# Patient Record
Sex: MALE | Race: ASIAN | URBAN - METROPOLITAN AREA
[De-identification: names, ages, dates, MRNs, and addresses within clinical notes are randomized per-mention and may not be internally consistent; named-entity substitution may affect disease eponyms.]

---

## 2018-12-28 ENCOUNTER — EMERGENCY (EMERGENCY)
Facility: HOSPITAL | Age: 48
LOS: 1 days | Discharge: HOME | End: 2018-12-28
Attending: EMERGENCY MEDICINE
Payer: MEDICAID

## 2018-12-28 VITALS
TEMPERATURE: 98 F | HEART RATE: 93 BPM | DIASTOLIC BLOOD PRESSURE: 67 MMHG | OXYGEN SATURATION: 97 % | WEIGHT: 169.98 LBS | RESPIRATION RATE: 18 BRPM | HEIGHT: 68 IN | SYSTOLIC BLOOD PRESSURE: 107 MMHG

## 2018-12-28 PROCEDURE — 99284 EMERGENCY DEPT VISIT MOD MDM: CPT | Mod: 25

## 2018-12-28 PROCEDURE — 62270 DX LMBR SPI PNXR: CPT

## 2018-12-28 PROCEDURE — 93010 ELECTROCARDIOGRAM REPORT: CPT | Mod: 59

## 2018-12-28 PROCEDURE — 70450 CT HEAD/BRAIN W/O DYE: CPT | Mod: 26

## 2018-12-28 RX ORDER — METOCLOPRAMIDE HCL 10 MG
10 TABLET ORAL ONCE
Qty: 0 | Refills: 0 | Status: COMPLETED | OUTPATIENT
Start: 2018-12-28 | End: 2018-12-28

## 2018-12-28 RX ORDER — ACETAMINOPHEN 500 MG
1000 TABLET ORAL ONCE
Qty: 0 | Refills: 0 | Status: COMPLETED | OUTPATIENT
Start: 2018-12-28 | End: 2018-12-28

## 2018-12-28 RX ORDER — ONDANSETRON 8 MG/1
4 TABLET, FILM COATED ORAL ONCE
Qty: 0 | Refills: 0 | Status: DISCONTINUED | OUTPATIENT
Start: 2018-12-28 | End: 2018-12-28

## 2018-12-28 RX ORDER — SODIUM CHLORIDE 9 MG/ML
1000 INJECTION INTRAMUSCULAR; INTRAVENOUS; SUBCUTANEOUS ONCE
Qty: 0 | Refills: 0 | Status: COMPLETED | OUTPATIENT
Start: 2018-12-28 | End: 2018-12-28

## 2018-12-28 NOTE — ED ADULT NURSE NOTE - ED STAT RN HANDOFF DETAILS
Bedside report given to on coming nurse (NAME). Understands pmh, medications given and plan of care for patient. Patient in stable condition, vital signs updated, has no complaints at this time and has been updated on care plan. Explained to patient that it is change of shift and new nurse is taking over, pt verbalized understanding.

## 2018-12-28 NOTE — ED PROVIDER NOTE - NSFOLLOWUPINSTRUCTIONS_ED_ALL_ED_FT
You were seen for a headache. At this time your head ct scan is negative  bleed. According to neurosurgery your lumbar puncture is not concerning for a brain hemorrhage. If you have worsening headache, weakness or numbness in your arms or legs, confusion, dizziness, nausea, vomiting, please return to the ED immediately. Follow up with neurology in 24-48 hours for further evaluation of your headache. Take the tamiflu twice a day for 5 days.     Neurology: [phone] You were seen for a headache. At this time your head ct and angiogram are negative. Please follow up with neurology, phone number is below, in 24-48 hours. Please return to the ED immediately if you have worsening symptoms, new symptoms including nausea, vomiting, dizziness, confusion, or if you pass out. Return for other new symptoms. FOllow up with your primary care physician in 24-48 hours.      Neurology: [phone]

## 2018-12-28 NOTE — ED PROVIDER NOTE - PHYSICAL EXAMINATION
PHYSICAL EXAM:    GENERAL: Comfortable, no acute distress   HEAD:  Normocephalic, atraumatic  EYES: EOMI, PERRLA  HEENT: Moist mucous membranes  NECK: Supple, No JVD  NERVOUS SYSTEM:  Alert & Oriented X3, Motor Strength 5/5 B/L upper and lower extremities  CHEST/LUNG: Clear to auscultation bilaterally  HEART: Regular rate and rhythm, no murmur   ABDOMEN: Soft, mild  LUQ tenderness, Nondistended, Bowel sounds present  EXTREMITIES:   No clubbing, cyanosis, or edema  MUSCULOSKELETAL: No muscle tenderness, no joint tenderness  SKIN:  warm and dry, no rash

## 2018-12-28 NOTE — ED ADULT NURSE NOTE - OBJECTIVE STATEMENT
49 YO male with PMH hyperthyroidism no longer on medication for six months  via walk in presenting with complaints of nausea, vomiting, and stomach discomfort. Pt is Turkmen speaking, requesting family at bedside to translate. As per patient, since the AM of 12/28/2018, pt developed nausea and emesis. Pt reporting 6 episodes of NBNB emesis, associated with chills. Pt states over the day he experienced worsenign abdominal pain, located to the epigastric region, described as aching, releived with lying flat, worsened with palpation. Pt denies chest pain, shortness of breath, visual disturbances, numbness/tingling, fever,  diaphoresis, headache, constipation, diarrhea, or urinary symptoms.   Pt Axox4, gross neuro intact, PERRL 3 mm, yellow sclera. Lungs clear throughout bilateral. S1S2 heard. Abdomen soft, tender to palpation, non-distended. Skin warm, dry, and intact. Pt placed in position of comfort. Pt educated on call bell system and provided call bell. Bed in lowest position, wheels locked, appropriate side rails raised. Pt denies needs at this time.

## 2018-12-28 NOTE — ED PROVIDER NOTE - OBJECTIVE STATEMENT
49 yo male PMH hyperthyroidism, syncopal ep a month ago, presenting with headache and chest pressure. Pt said around 9am this morning he started feeling headache, 8/10 in the posterior head now migrated to frontal area. Also reported chest pressure, mid sternal, no radiation, nonexertional in nature. Pt took tylenol but did not help the pain. Pt also had nausea and vomiting which relieved the chest pain/pressure. Also complains of left upper quadrant pain.   Pt had syncopal episode a month ago and was worked up. Pt reported fell like a statue. Workup was nondiagnostic. Pt was told it may have been an arrythmia.

## 2018-12-28 NOTE — ED PROVIDER NOTE - ATTENDING CONTRIBUTION TO CARE
Afebrile. Awake and Alert. Lungs CTA. Heart RRR. Abdomen soft NTND. Neurologic exam: A&O x3, speech clear, HEIDI, CN II-XII intact, motor strength +5/5 in all extremities, sensation equal bilaterally, finger-to-nose normal, gait steady. CT Head r/o SAH, consider LP if negative. CXR given cough. EKG non-ischemic and HsT given CP.

## 2018-12-28 NOTE — ED ADULT NURSE NOTE - NSIMPLEMENTINTERV_GEN_ALL_ED
Implemented All Fall Risk Interventions:  [place] to call system. Call bell, personal items and telephone within reach. Instruct patient to call for assistance. Room bathroom lighting operational. Non-slip footwear when patient is off stretcher. Physically safe environment: no spills, clutter or unnecessary equipment. Stretcher in lowest position, wheels locked, appropriate side rails in place. Provide visual cue, wrist band, yellow gown, etc. Monitor gait and stability. Monitor for mental status changes and reorient to person, place, and time. Review medications for side effects contributing to fall risk. Reinforce activity limits and safety measures with patient and family.

## 2018-12-28 NOTE — ED PROVIDER NOTE - NS ED NOTE AC HIGH RISK COUNTRIES
Telephone Encounter by Kade Coffey MD at 05/16/17 11:50 AM     Author:  Kade Coffey MD Service:  (none) Author Type:  Physician     Filed:  05/16/17 11:51 AM Encounter Date:  5/11/2017 Status:  Signed     :  Kade Coffey MD (Physician)            Ibuprofen 800mg tid prn #90 w/ 1 refill  Needs appt[AB1.1M]       Revision History        User Key Date/Time User Provider Type Action    > AB1.1 05/16/17 11:51 AM Kade Coffey MD Physician Sign    M - Manual             No

## 2018-12-28 NOTE — ED PROVIDER NOTE - PROGRESS NOTE DETAILS
MD Green:  patient signed out to me by Dr. Evans.  47 yo M, c/o 36 hrs of HA (woke up with it yesterday), CT head negative for evidence of bleeding; LP demonstrates diminishing RBC by 75% from tube 2-->4; no xanthochromia.  Patient's physical exam is unremarkable:  no photo/phonophobia, no nuchal rigidity or meningismus, no nausea/vomiting.  Together, these findings make occult SAH/sentinel bleed very unlikely.  Patient should f/u with PMD/neurology for further evaluation of this condition, strict return precautions.  +influenza RVP < 48hrs, will Tx with tamiflu. Simms: CTA negative for aneurysm. results and return precautions provided

## 2018-12-28 NOTE — ED PROVIDER NOTE - MEDICAL DECISION MAKING DETAILS
47 yo pmh syncope, hyperthyroidism presenting with headache, chest pressure, abd pain, nausea and vomiting. CT neg however onset of headache ~9am. will consider LP if pt not responding to pain meds. zofran, tylenol, reglan, 1L ns

## 2018-12-29 VITALS
TEMPERATURE: 99 F | SYSTOLIC BLOOD PRESSURE: 122 MMHG | OXYGEN SATURATION: 98 % | DIASTOLIC BLOOD PRESSURE: 66 MMHG | RESPIRATION RATE: 18 BRPM | HEART RATE: 74 BPM

## 2018-12-29 LAB
ALBUMIN SERPL ELPH-MCNC: 4.6 G/DL — SIGNIFICANT CHANGE UP (ref 3.3–5)
ALP SERPL-CCNC: 77 U/L — SIGNIFICANT CHANGE UP (ref 40–120)
ALT FLD-CCNC: 23 U/L — SIGNIFICANT CHANGE UP (ref 10–45)
ANION GAP SERPL CALC-SCNC: 16 MMOL/L — SIGNIFICANT CHANGE UP (ref 5–17)
APPEARANCE CSF: CLEAR — SIGNIFICANT CHANGE UP
APPEARANCE CSF: CLEAR — SIGNIFICANT CHANGE UP
APTT BLD: 25.6 SEC — LOW (ref 27.5–36.3)
AST SERPL-CCNC: 21 U/L — SIGNIFICANT CHANGE UP (ref 10–40)
BILIRUB SERPL-MCNC: 0.5 MG/DL — SIGNIFICANT CHANGE UP (ref 0.2–1.2)
BUN SERPL-MCNC: 7 MG/DL — SIGNIFICANT CHANGE UP (ref 7–23)
CALCIUM SERPL-MCNC: 9.4 MG/DL — SIGNIFICANT CHANGE UP (ref 8.4–10.5)
CHLORIDE SERPL-SCNC: 95 MMOL/L — LOW (ref 96–108)
CO2 SERPL-SCNC: 25 MMOL/L — SIGNIFICANT CHANGE UP (ref 22–31)
COLOR CSF: SIGNIFICANT CHANGE UP
COLOR CSF: SIGNIFICANT CHANGE UP
CREAT SERPL-MCNC: 0.59 MG/DL — SIGNIFICANT CHANGE UP (ref 0.5–1.3)
FLUAV H3 RNA SPEC QL NAA+PROBE: DETECTED
GLUCOSE CSF-MCNC: 69 MG/DL — SIGNIFICANT CHANGE UP (ref 40–70)
GLUCOSE SERPL-MCNC: 115 MG/DL — HIGH (ref 70–99)
GRAM STN FLD: SIGNIFICANT CHANGE UP
HCT VFR BLD CALC: 46 % — SIGNIFICANT CHANGE UP (ref 39–50)
HGB BLD-MCNC: 16.1 G/DL — SIGNIFICANT CHANGE UP (ref 13–17)
INR BLD: 1.1 RATIO — SIGNIFICANT CHANGE UP (ref 0.88–1.16)
LYMPHOCYTES # CSF: 50 % — SIGNIFICANT CHANGE UP (ref 40–80)
LYMPHOCYTES # CSF: 67 % — SIGNIFICANT CHANGE UP (ref 40–80)
MCHC RBC-ENTMCNC: 31.4 PG — SIGNIFICANT CHANGE UP (ref 27–34)
MCHC RBC-ENTMCNC: 35 GM/DL — SIGNIFICANT CHANGE UP (ref 32–36)
MCV RBC AUTO: 89.7 FL — SIGNIFICANT CHANGE UP (ref 80–100)
MONOS+MACROS NFR CSF: 33 % — SIGNIFICANT CHANGE UP (ref 15–45)
MONOS+MACROS NFR CSF: 38 % — SIGNIFICANT CHANGE UP (ref 15–45)
NEUTROPHILS # CSF: 0 % — SIGNIFICANT CHANGE UP (ref 0–6)
NEUTROPHILS # CSF: 12 % — HIGH (ref 0–6)
NRBC NFR CSF: 1 /UL — SIGNIFICANT CHANGE UP (ref 0–5)
NRBC NFR CSF: 1 /UL — SIGNIFICANT CHANGE UP (ref 0–5)
PLATELET # BLD AUTO: 216 K/UL — SIGNIFICANT CHANGE UP (ref 150–400)
POTASSIUM SERPL-MCNC: 4 MMOL/L — SIGNIFICANT CHANGE UP (ref 3.5–5.3)
POTASSIUM SERPL-SCNC: 4 MMOL/L — SIGNIFICANT CHANGE UP (ref 3.5–5.3)
PROT CSF-MCNC: 23 MG/DL — SIGNIFICANT CHANGE UP (ref 15–45)
PROT SERPL-MCNC: 7.8 G/DL — SIGNIFICANT CHANGE UP (ref 6–8.3)
PROTHROM AB SERPL-ACNC: 12.7 SEC — SIGNIFICANT CHANGE UP (ref 10–12.9)
RAPID RVP RESULT: DETECTED
RBC # BLD: 5.13 M/UL — SIGNIFICANT CHANGE UP (ref 4.2–5.8)
RBC # CSF: 385 /UL — HIGH (ref 0–0)
RBC # CSF: 83 /UL — HIGH (ref 0–0)
RBC # FLD: 12.1 % — SIGNIFICANT CHANGE UP (ref 10.3–14.5)
SODIUM SERPL-SCNC: 136 MMOL/L — SIGNIFICANT CHANGE UP (ref 135–145)
SPECIMEN SOURCE: SIGNIFICANT CHANGE UP
TROPONIN T, HIGH SENSITIVITY RESULT: <6 NG/L — SIGNIFICANT CHANGE UP (ref 0–51)
TUBE TYPE: SIGNIFICANT CHANGE UP
TUBE TYPE: SIGNIFICANT CHANGE UP
WBC # BLD: 4.7 K/UL — SIGNIFICANT CHANGE UP (ref 3.8–10.5)
WBC # FLD AUTO: 4.7 K/UL — SIGNIFICANT CHANGE UP (ref 3.8–10.5)

## 2018-12-29 PROCEDURE — 96375 TX/PRO/DX INJ NEW DRUG ADDON: CPT | Mod: XU

## 2018-12-29 PROCEDURE — 70496 CT ANGIOGRAPHY HEAD: CPT | Mod: 26

## 2018-12-29 PROCEDURE — 62270 DX LMBR SPI PNXR: CPT

## 2018-12-29 PROCEDURE — 96374 THER/PROPH/DIAG INJ IV PUSH: CPT | Mod: XU

## 2018-12-29 PROCEDURE — 93005 ELECTROCARDIOGRAM TRACING: CPT | Mod: XU

## 2018-12-29 PROCEDURE — 87070 CULTURE OTHR SPECIMN AEROBIC: CPT

## 2018-12-29 PROCEDURE — 70498 CT ANGIOGRAPHY NECK: CPT | Mod: 26

## 2018-12-29 PROCEDURE — 87486 CHLMYD PNEUM DNA AMP PROBE: CPT

## 2018-12-29 PROCEDURE — 87633 RESP VIRUS 12-25 TARGETS: CPT

## 2018-12-29 PROCEDURE — 84484 ASSAY OF TROPONIN QUANT: CPT

## 2018-12-29 PROCEDURE — 89051 BODY FLUID CELL COUNT: CPT

## 2018-12-29 PROCEDURE — 82945 GLUCOSE OTHER FLUID: CPT

## 2018-12-29 PROCEDURE — 84157 ASSAY OF PROTEIN OTHER: CPT

## 2018-12-29 PROCEDURE — 80053 COMPREHEN METABOLIC PANEL: CPT

## 2018-12-29 PROCEDURE — 85610 PROTHROMBIN TIME: CPT

## 2018-12-29 PROCEDURE — 71046 X-RAY EXAM CHEST 2 VIEWS: CPT | Mod: 26

## 2018-12-29 PROCEDURE — 70498 CT ANGIOGRAPHY NECK: CPT

## 2018-12-29 PROCEDURE — 87581 M.PNEUMON DNA AMP PROBE: CPT

## 2018-12-29 PROCEDURE — 99285 EMERGENCY DEPT VISIT HI MDM: CPT | Mod: 25

## 2018-12-29 PROCEDURE — 85730 THROMBOPLASTIN TIME PARTIAL: CPT

## 2018-12-29 PROCEDURE — 87205 SMEAR GRAM STAIN: CPT

## 2018-12-29 PROCEDURE — 87798 DETECT AGENT NOS DNA AMP: CPT

## 2018-12-29 PROCEDURE — 96361 HYDRATE IV INFUSION ADD-ON: CPT | Mod: XU

## 2018-12-29 PROCEDURE — 70450 CT HEAD/BRAIN W/O DYE: CPT

## 2018-12-29 PROCEDURE — 71046 X-RAY EXAM CHEST 2 VIEWS: CPT

## 2018-12-29 PROCEDURE — 70496 CT ANGIOGRAPHY HEAD: CPT

## 2018-12-29 PROCEDURE — 85027 COMPLETE CBC AUTOMATED: CPT

## 2018-12-29 RX ORDER — ONDANSETRON 8 MG/1
4 TABLET, FILM COATED ORAL ONCE
Qty: 0 | Refills: 0 | Status: COMPLETED | OUTPATIENT
Start: 2018-12-29 | End: 2018-12-29

## 2018-12-29 RX ORDER — ACETAMINOPHEN 500 MG
975 TABLET ORAL ONCE
Qty: 0 | Refills: 0 | Status: COMPLETED | OUTPATIENT
Start: 2018-12-29 | End: 2018-12-29

## 2018-12-29 RX ADMIN — SODIUM CHLORIDE 1000 MILLILITER(S): 9 INJECTION INTRAMUSCULAR; INTRAVENOUS; SUBCUTANEOUS at 00:25

## 2018-12-29 RX ADMIN — SODIUM CHLORIDE 1000 MILLILITER(S): 9 INJECTION INTRAMUSCULAR; INTRAVENOUS; SUBCUTANEOUS at 00:49

## 2018-12-29 RX ADMIN — Medication 10 MILLIGRAM(S): at 00:27

## 2018-12-29 RX ADMIN — Medication 1000 MILLIGRAM(S): at 00:32

## 2018-12-29 RX ADMIN — Medication 400 MILLIGRAM(S): at 00:28

## 2018-12-29 RX ADMIN — Medication 975 MILLIGRAM(S): at 13:41

## 2018-12-29 RX ADMIN — Medication 75 MILLIGRAM(S): at 13:40

## 2018-12-29 RX ADMIN — ONDANSETRON 4 MILLIGRAM(S): 8 TABLET, FILM COATED ORAL at 13:41

## 2018-12-29 NOTE — CHART NOTE - NSCHARTNOTEFT_GEN_A_CORE
Called regarding r/o ruptured aneurysm for sudden onsent headache and nausea  - 48M with unremarkable CTH and likely traumatic lumbar puncter (tube 1 ->83 in Tube 4) with NO xanthochromia or other abnormalities  - Given lack of evidence of intracranial hemorrhage recommend outpatient workup with MRI/MRA with neurology if concern for intracranial hemorrhage remains Called regarding r/o ruptured aneurysm for sudden onsent headache and nausea  - 48M with unremarkable CTH and likely traumatic lumbar puncture (tube 1 ->83 in Tube 4) with NO xanthochromia or other abnormalities  - Given lack of evidence of intracranial hemorrhage recommend outpatient workup with MRI/MRA with neurology if concern for intracranial hemorrhage remains

## 2018-12-29 NOTE — ED PROCEDURE NOTE - ATTENDING CONTRIBUTION TO CARE
I was physically present for the E/M service provided. I was physically present for the key portions of the service provided. RUPALI.

## 2018-12-29 NOTE — ED ADULT NURSE REASSESSMENT NOTE - NS ED NURSE REASSESS COMMENT FT1
Pt placed in position of comfort. Pt educated on call bell system and provided call bell. Bed in lowest position, wheels locked, appropriate side rails raised. Pt denies needs at this time.

## 2019-01-01 LAB
CULTURE RESULTS: NO GROWTH — SIGNIFICANT CHANGE UP
SPECIMEN SOURCE: SIGNIFICANT CHANGE UP

## 2019-01-29 PROBLEM — R55 SYNCOPE AND COLLAPSE: Chronic | Status: ACTIVE | Noted: 2018-12-29

## 2019-02-05 PROBLEM — Z00.00 ENCOUNTER FOR PREVENTIVE HEALTH EXAMINATION: Status: ACTIVE | Noted: 2019-02-05

## 2019-03-20 ENCOUNTER — APPOINTMENT (OUTPATIENT)
Dept: CARDIOLOGY | Facility: CLINIC | Age: 49
End: 2019-03-20
Payer: MEDICAID

## 2019-03-20 VITALS
WEIGHT: 179 LBS | SYSTOLIC BLOOD PRESSURE: 116 MMHG | BODY MASS INDEX: 26.51 KG/M2 | TEMPERATURE: 98.1 F | OXYGEN SATURATION: 96 % | RESPIRATION RATE: 18 BRPM | HEIGHT: 69 IN | DIASTOLIC BLOOD PRESSURE: 78 MMHG | HEART RATE: 80 BPM

## 2019-03-20 DIAGNOSIS — Z87.898 PERSONAL HISTORY OF OTHER SPECIFIED CONDITIONS: ICD-10-CM

## 2019-03-20 DIAGNOSIS — R55 SYNCOPE AND COLLAPSE: ICD-10-CM

## 2019-03-20 PROCEDURE — 99204 OFFICE O/P NEW MOD 45 MIN: CPT

## 2019-03-20 PROCEDURE — 93000 ELECTROCARDIOGRAM COMPLETE: CPT

## 2019-03-21 NOTE — HISTORY OF PRESENT ILLNESS
[FreeTextEntry1] : 50 y/o M with no PMH who had one episode of syncope in 11/2018, is here for initial evaluation.  Referring cardiologist: Dr. Dani Landeros. \par He worked as a  and that day, he was just walking around and as he was approaching the counter he suddenly didn't feel good. He described having a "cold sensation" in the back of his head and then falling forward hitting the counter before falling on the ground.  He showed us the video from the store of the event.  No seizure activities noted.  He was taken to [facility] where he said he had test done for his head and heart. Was told no bleed in brain. Reportedly normal cardiac imaging. No record for us to review at this time.  He saw Dr. Landeros a week or two ago.  Per Dr. Landeros, his Echo in March was normal.  The patient states that this was the first time he ever experienced syncope.  He would get some dizziness with standing up quickly from a seated position, but otherwise no dizziness / lightheadedness.  He sometimes gets headache in the back of the head and then pressure in the eyes.  He attributes this to high blood pressure although he is not diagnosed with HTN or on medications.  Denies CP / SOB / palpitations. \par

## 2019-03-21 NOTE — DISCUSSION/SUMMARY
[FreeTextEntry1] : 48 y/o M with syncope in 11/2018 while walking in the store. EKG with an incomplete RBBB () and left axis deviation. Echo was reportedly normal per his cardiologist (dr. Landeros).  We explained to him that this could be vasovagal and encouraged him to increase hydration and to avoid sudden change in position. However, at this time given slight abnormal EKG, we recommend a loop recorder for long term cardiac rhythm monitor.  He is agreeable.  We informed Dr. Landeros of the decision. \par

## 2019-03-21 NOTE — REASON FOR VISIT
[Consultation] : a consultation regarding [Syncope] : syncope [FreeTextEntry1] : Used Tajik  line: ID [id number]

## 2019-03-21 NOTE — PHYSICAL EXAM
[General Appearance - Well Nourished] : well nourished [General Appearance - Well Developed] : well developed [Normal Conjunctiva] : the conjunctiva exhibited no abnormalities [Normal Oral Mucosa] : normal oral mucosa [Heart Sounds] : normal S1 and S2 [Heart Rate And Rhythm] : heart rate and rhythm were normal [Arterial Pulses Normal] : the arterial pulses were normal [Murmurs] : no murmurs present [Edema] : no peripheral edema present [Auscultation Breath Sounds / Voice Sounds] : lungs were clear to auscultation bilaterally [Respiration, Rhythm And Depth] : normal respiratory rhythm and effort [Abdomen Tenderness] : non-tender [Bowel Sounds] : normal bowel sounds [Abdomen Soft] : soft [Abnormal Walk] : normal gait [] : no rash [No Anxiety] : not feeling anxious [Oriented To Time, Place, And Person] : oriented to person, place, and time [Impaired Insight] : insight and judgment were intact [FreeTextEntry1] : No LE edema

## 2019-09-09 ENCOUNTER — APPOINTMENT (OUTPATIENT)
Dept: CARDIOLOGY | Facility: CLINIC | Age: 49
End: 2019-09-09

## 2019-12-16 ENCOUNTER — RESULT REVIEW (OUTPATIENT)
Age: 49
End: 2019-12-16

## 2021-01-28 DIAGNOSIS — R07.9 CHEST PAIN, UNSPECIFIED: ICD-10-CM

## 2021-02-01 ENCOUNTER — NON-APPOINTMENT (OUTPATIENT)
Age: 51
End: 2021-02-01

## 2021-02-23 ENCOUNTER — APPOINTMENT (OUTPATIENT)
Dept: CARDIOLOGY | Facility: CLINIC | Age: 51
End: 2021-02-23

## 2023-04-03 NOTE — ED ADULT NURSE NOTE - CHIEF COMPLAINT
PROCEDURE:  Right total knee revision. 1/13/23     DIAGNOSIS: Stage II infected total knee arthroplasty.       TIME INTERVAL: 3 M     DVT PROPHYLAXIS: finished     PHYSICAL THERAPY  1 X per week     AMBULATORY AID:  None     INCISION:  Healed     PAIN MED: HYDROCODONE  1 per day   REFILL NEEDED: no     CONCERNS:  NONE       The patient is a 48y Male complaining of

## 2025-01-13 ENCOUNTER — APPOINTMENT (OUTPATIENT)
Dept: UROLOGY | Facility: CLINIC | Age: 55
End: 2025-01-13

## 2025-02-13 ENCOUNTER — APPOINTMENT (OUTPATIENT)
Dept: UROLOGY | Facility: CLINIC | Age: 55
End: 2025-02-13
Payer: COMMERCIAL

## 2025-02-13 VITALS — WEIGHT: 180 LBS | HEIGHT: 69 IN | BODY MASS INDEX: 26.66 KG/M2

## 2025-02-13 VITALS — SYSTOLIC BLOOD PRESSURE: 137 MMHG | DIASTOLIC BLOOD PRESSURE: 88 MMHG

## 2025-02-13 DIAGNOSIS — R36.1 HEMATOSPERMIA: ICD-10-CM

## 2025-02-13 DIAGNOSIS — Z12.5 ENCOUNTER FOR SCREENING FOR MALIGNANT NEOPLASM OF PROSTATE: ICD-10-CM

## 2025-02-13 DIAGNOSIS — Z80.42 FAMILY HISTORY OF MALIGNANT NEOPLASM OF PROSTATE: ICD-10-CM

## 2025-02-13 PROCEDURE — 99203 OFFICE O/P NEW LOW 30 MIN: CPT

## 2025-02-18 LAB
APPEARANCE: CLEAR
BACTERIA UR CULT: NORMAL
BACTERIA: NEGATIVE /HPF
BILIRUBIN URINE: NEGATIVE
BLOOD URINE: NEGATIVE
C TRACH RRNA SPEC QL NAA+PROBE: NOT DETECTED
CAST: 0 /LPF
COLOR: YELLOW
EPITHELIAL CELLS: 0 /HPF
GLUCOSE QUALITATIVE U: NEGATIVE MG/DL
HIV1+2 AB SPEC QL IA.RAPID: NONREACTIVE
KETONES URINE: NEGATIVE MG/DL
LEUKOCYTE ESTERASE URINE: NEGATIVE
MICROSCOPIC-UA: NORMAL
N GONORRHOEA RRNA SPEC QL NAA+PROBE: NOT DETECTED
NITRITE URINE: NEGATIVE
PH URINE: 6.5
PROTEIN URINE: NEGATIVE MG/DL
PSA SERPL-MCNC: 1.95 NG/ML
RED BLOOD CELLS URINE: 1 /HPF
SOURCE AMPLIFICATION: NORMAL
SPECIFIC GRAVITY URINE: 1.02
T PALLIDUM AB SER QL IA: NEGATIVE
UROBILINOGEN URINE: 0.2 MG/DL
WHITE BLOOD CELLS URINE: 0 /HPF